# Patient Record
Sex: MALE | Race: WHITE | Employment: STUDENT | URBAN - NONMETROPOLITAN AREA
[De-identification: names, ages, dates, MRNs, and addresses within clinical notes are randomized per-mention and may not be internally consistent; named-entity substitution may affect disease eponyms.]

---

## 2024-08-24 ENCOUNTER — APPOINTMENT (OUTPATIENT)
Dept: GENERAL RADIOLOGY | Age: 19
End: 2024-08-24
Payer: COMMERCIAL

## 2024-08-24 ENCOUNTER — HOSPITAL ENCOUNTER (EMERGENCY)
Age: 19
Discharge: HOME | End: 2024-08-24
Attending: PHYSICIAN ASSISTANT
Payer: COMMERCIAL

## 2024-08-24 VITALS
HEIGHT: 76 IN | BODY MASS INDEX: 28.01 KG/M2 | OXYGEN SATURATION: 99 % | HEART RATE: 89 BPM | WEIGHT: 230 LBS | TEMPERATURE: 97.9 F | RESPIRATION RATE: 20 BRPM | SYSTOLIC BLOOD PRESSURE: 131 MMHG | DIASTOLIC BLOOD PRESSURE: 74 MMHG

## 2024-08-24 DIAGNOSIS — S46.912A ELBOW STRAIN, LEFT, INITIAL ENCOUNTER: Primary | ICD-10-CM

## 2024-08-24 PROCEDURE — 99283 EMERGENCY DEPT VISIT LOW MDM: CPT

## 2024-08-24 PROCEDURE — 73080 X-RAY EXAM OF ELBOW: CPT

## 2024-08-24 RX ORDER — DEXMETHYLPHENIDATE HYDROCHLORIDE 10 MG/1
20 TABLET ORAL DAILY
COMMUNITY

## 2024-08-24 ASSESSMENT — ENCOUNTER SYMPTOMS: RESPIRATORY NEGATIVE: 1

## 2024-08-24 NOTE — ED PROVIDER NOTES
[facility] EMERGENCY DEPT    [facility] Emergency Department Encounter    Pt Name: Max Leonard  MRN: [id number]  Birthdate 2005  Date of evaluation: 8/24/2024      CHIEF COMPLAINT    Chief Complaint   Patient presents with    Elbow Injury     left             HISTORY OF PRESENT ILLNESS       Max Leonard is a 19 y.o. male who presents to the emergency dept  with left elbow pain.  He states he was at football practice and got ran into by another player he states he jammed his elbow with a helmet into himself.  He did not have any hyperextension.  He states it hurt quite a bit his  told him he may have dislocated it she splinted it and sent him here he did not denies feeling a pop or a snap.  He does have tenderness with range of motion.  He does not have any numbness or tingling in his fingers.  He is not having shoulder pain.  He does not have any other complaints at this time.           REVIEW OF SYSTEMS    Review of Systems   Constitutional: Negative.    Respiratory: Negative.     Cardiovascular: Negative.    Musculoskeletal:  Positive for arthralgias, joint swelling and myalgias. Negative for neck pain and neck stiffness.   All other systems reviewed and are negative.        PAST MEDICAL HISTORY   has no past medical history on file.    SURGICAL HISTORY     has no past surgical history on file.    CURRENT MEDICATIONS    No current facility-administered medications for this encounter.    Current Outpatient Medications:     dexmethylphenidate (FOCALIN) 10 MG tablet, Take 2 tablets by mouth daily. Max Daily Amount: 20 mg, Disp: , Rfl:     ALLERGIES    has No Known Allergies.    FAMILY HISTORY    has no family status information on file.    family history is not on file.    SOCIAL HISTORY         PHYSICAL EXAM    INITIAL VITALS:  height is 1.93 m (6' 4\") and weight

## 2024-08-24 NOTE — ED NOTES
Patient to ED for possible left elbow dislocation. Patient states he was playing football and a play ran into him.  wrapped arm and advise patient to come to ER. Patient states he took motrin prior to arrival pain 2/10

## 2024-10-23 ENCOUNTER — HOSPITAL ENCOUNTER (EMERGENCY)
Age: 19
Discharge: HOME | End: 2024-10-23
Payer: COMMERCIAL

## 2024-10-23 VITALS
RESPIRATION RATE: 16 BRPM | HEART RATE: 73 BPM | OXYGEN SATURATION: 97 % | SYSTOLIC BLOOD PRESSURE: 126 MMHG | TEMPERATURE: 98 F | DIASTOLIC BLOOD PRESSURE: 76 MMHG | WEIGHT: 230 LBS | BODY MASS INDEX: 28.6 KG/M2 | HEIGHT: 75 IN

## 2024-10-23 DIAGNOSIS — J06.9 VIRAL URI WITH COUGH: Primary | ICD-10-CM

## 2024-10-23 LAB — SARS-COV-2 RDRP RESP QL NAA+PROBE: NOT  DETECTED

## 2024-10-23 PROCEDURE — 6370000000 HC RX 637 (ALT 250 FOR IP)

## 2024-10-23 PROCEDURE — 99213 OFFICE O/P EST LOW 20 MIN: CPT

## 2024-10-23 PROCEDURE — 87635 SARS-COV-2 COVID-19 AMP PRB: CPT

## 2024-10-23 RX ORDER — ONDANSETRON 4 MG/1
4 TABLET, FILM COATED ORAL 3 TIMES DAILY PRN
Qty: 15 TABLET | Refills: 0 | Status: SHIPPED | OUTPATIENT
Start: 2024-10-23

## 2024-10-23 RX ORDER — ONDANSETRON 4 MG/1
4 TABLET, ORALLY DISINTEGRATING ORAL ONCE
Status: COMPLETED | OUTPATIENT
Start: 2024-10-23 | End: 2024-10-23

## 2024-10-23 RX ORDER — BROMPHENIRAMINE MALEATE, PSEUDOEPHEDRINE HYDROCHLORIDE, AND DEXTROMETHORPHAN HYDROBROMIDE 2; 30; 10 MG/5ML; MG/5ML; MG/5ML
5 SYRUP ORAL 4 TIMES DAILY PRN
Qty: 118 ML | Refills: 0 | Status: SHIPPED | OUTPATIENT
Start: 2024-10-23 | End: 2024-10-29

## 2024-10-23 RX ADMIN — ONDANSETRON 4 MG: 4 TABLET, ORALLY DISINTEGRATING ORAL at 09:53

## 2024-10-23 ASSESSMENT — ENCOUNTER SYMPTOMS
DIARRHEA: 1
SORE THROAT: 0
ALLERGIC/IMMUNOLOGIC NEGATIVE: 1
COUGH: 1
EYES NEGATIVE: 1

## 2024-10-23 ASSESSMENT — PAIN - FUNCTIONAL ASSESSMENT: PAIN_FUNCTIONAL_ASSESSMENT: NONE - DENIES PAIN

## 2024-10-23 NOTE — ED PROVIDER NOTES
[facility] URGENT CARE  Urgent Care Encounter       CHIEF COMPLAINT       Chief Complaint   Patient presents with    Cough     Fatigue, vomiting, diarrhea,. Light-headed       Nurses Notes reviewed and I agree except as noted in the HPI.  HISTORY OF PRESENT ILLNESS   Max Leonard is a 19 y.o. male who presents to urgent care for cough, fatigue, vomiting and diarrhea.  Symptoms started 3 days ago.  States has tried over the counter tylenol with little relief.  Denies abdominal pain and fever.    The history is provided by the patient. No  was used.       REVIEW OF SYSTEMS     Review of Systems   Constitutional:  Positive for fatigue.   HENT:  Positive for congestion. Negative for sore throat.    Eyes: Negative.    Respiratory:  Positive for cough.    Cardiovascular: Negative.    Gastrointestinal:  Positive for diarrhea.   Endocrine: Negative.    Genitourinary: Negative.    Musculoskeletal: Negative.    Skin: Negative.    Allergic/Immunologic: Negative.    Neurological: Negative.    Hematological: Negative.    Psychiatric/Behavioral: Negative.         PAST MEDICAL HISTORY   History reviewed. No pertinent past medical history.    SURGICALHISTORY     Patient  has no past surgical history on file.    CURRENT MEDICATIONS       Previous Medications    DEXMETHYLPHENIDATE (FOCALIN) 10 MG TABLET    Take 2 tablets by mouth daily.       ALLERGIES     Patient is has No Known Allergies.    Patients   Immunization History   Administered Date(s) Administered    COVID-19, PFIZER PURPLE top, DILUTE for use, (age 12 y+), 30mcg/0.3mL 04/29/2021, 05/20/2021       FAMILY HISTORY     Patient's family history is not on file.    SOCIAL HISTORY     Patient  reports that he has been smoking cigarettes. He has never used smokeless tobacco. He reports current alcohol use. He reports that he does not use drugs.    PHYSICAL EXAM     ED TRIAGE VITALS  BP: 126/76, Temp: 98 °F (36.7 °C), Pulse: 73, Respirations: 16,

## 2024-10-23 NOTE — ED TRIAGE NOTES
Patient ambulated to room with complaint of fatigue, chills, vomiting, diarrhea, cough and light-headed that started a couple of days ago

## 2024-10-23 NOTE — DISCHARGE INSTRUCTIONS
Medications as prescribed.  Increase fluid intake symptom with electrolytes.  Can take over-the-counter Flonase and Zyrtec for congestion.  Follow-up with family doctor in 3 to 5 days.  Go to emergency room for any shortness of breath or any new or worsening symptoms.